# Patient Record
Sex: FEMALE | Race: WHITE
[De-identification: names, ages, dates, MRNs, and addresses within clinical notes are randomized per-mention and may not be internally consistent; named-entity substitution may affect disease eponyms.]

---

## 2017-08-24 ENCOUNTER — HOSPITAL ENCOUNTER (OUTPATIENT)
Dept: HOSPITAL 17 - NEPC | Age: 67
Setting detail: OBSERVATION
LOS: 1 days | Discharge: HOME | End: 2017-08-25
Attending: HOSPITALIST | Admitting: HOSPITALIST
Payer: COMMERCIAL

## 2017-08-24 VITALS
DIASTOLIC BLOOD PRESSURE: 76 MMHG | HEART RATE: 79 BPM | RESPIRATION RATE: 18 BRPM | SYSTOLIC BLOOD PRESSURE: 127 MMHG | TEMPERATURE: 98.5 F | OXYGEN SATURATION: 98 %

## 2017-08-24 VITALS — SYSTOLIC BLOOD PRESSURE: 109 MMHG | DIASTOLIC BLOOD PRESSURE: 76 MMHG | HEART RATE: 66 BPM | RESPIRATION RATE: 20 BRPM

## 2017-08-24 VITALS — HEART RATE: 80 BPM | OXYGEN SATURATION: 99 %

## 2017-08-24 VITALS — HEIGHT: 64 IN | WEIGHT: 180.34 LBS | BODY MASS INDEX: 30.79 KG/M2

## 2017-08-24 VITALS
TEMPERATURE: 98.5 F | DIASTOLIC BLOOD PRESSURE: 65 MMHG | OXYGEN SATURATION: 96 % | HEART RATE: 81 BPM | SYSTOLIC BLOOD PRESSURE: 125 MMHG | RESPIRATION RATE: 20 BRPM

## 2017-08-24 DIAGNOSIS — M54.81: ICD-10-CM

## 2017-08-24 DIAGNOSIS — R00.1: ICD-10-CM

## 2017-08-24 DIAGNOSIS — Z85.3: ICD-10-CM

## 2017-08-24 DIAGNOSIS — E78.5: ICD-10-CM

## 2017-08-24 DIAGNOSIS — Z90.13: ICD-10-CM

## 2017-08-24 DIAGNOSIS — F32.9: ICD-10-CM

## 2017-08-24 DIAGNOSIS — R00.2: ICD-10-CM

## 2017-08-24 DIAGNOSIS — G89.29: ICD-10-CM

## 2017-08-24 DIAGNOSIS — K21.9: ICD-10-CM

## 2017-08-24 DIAGNOSIS — B96.20: ICD-10-CM

## 2017-08-24 DIAGNOSIS — E86.0: ICD-10-CM

## 2017-08-24 DIAGNOSIS — E07.9: ICD-10-CM

## 2017-08-24 DIAGNOSIS — R09.81: ICD-10-CM

## 2017-08-24 DIAGNOSIS — N17.9: ICD-10-CM

## 2017-08-24 DIAGNOSIS — N39.0: ICD-10-CM

## 2017-08-24 DIAGNOSIS — L98.9: ICD-10-CM

## 2017-08-24 DIAGNOSIS — R55: Primary | ICD-10-CM

## 2017-08-24 DIAGNOSIS — Z79.899: ICD-10-CM

## 2017-08-24 DIAGNOSIS — G43.909: ICD-10-CM

## 2017-08-24 DIAGNOSIS — E78.00: ICD-10-CM

## 2017-08-24 DIAGNOSIS — R06.02: ICD-10-CM

## 2017-08-24 DIAGNOSIS — M25.562: ICD-10-CM

## 2017-08-24 DIAGNOSIS — M79.89: ICD-10-CM

## 2017-08-24 DIAGNOSIS — M79.605: ICD-10-CM

## 2017-08-24 DIAGNOSIS — I45.10: ICD-10-CM

## 2017-08-24 DIAGNOSIS — K44.9: ICD-10-CM

## 2017-08-24 LAB
ANION GAP SERPL CALC-SCNC: 6 MEQ/L (ref 5–15)
APTT BLD: 24 SEC (ref 24.3–30.1)
BACTERIA #/AREA URNS HPF: (no result) /HPF
BASOPHILS # BLD AUTO: 0 TH/MM3 (ref 0–0.2)
BASOPHILS NFR BLD: 0.4 % (ref 0–2)
BUN SERPL-MCNC: 15 MG/DL (ref 7–18)
CHLORIDE SERPL-SCNC: 108 MEQ/L (ref 98–107)
CK SERPL-CCNC: 57 U/L (ref 26–192)
COLOR UR: YELLOW
COMMENT (UR): (no result)
CULTURE IF INDICATED: (no result)
EOSINOPHIL # BLD: 0.1 TH/MM3 (ref 0–0.4)
EOSINOPHIL NFR BLD: 1.1 % (ref 0–4)
ERYTHROCYTE [DISTWIDTH] IN BLOOD BY AUTOMATED COUNT: 13.1 % (ref 11.6–17.2)
GFR SERPLBLD BASED ON 1.73 SQ M-ARVRAT: 53 ML/MIN (ref 89–?)
GLUCOSE UR STRIP-MCNC: (no result) MG/DL
HCO3 BLD-SCNC: 26.7 MEQ/L (ref 21–32)
HCT VFR BLD CALC: 36.8 % (ref 35–46)
HEMO FLAGS: (no result)
HGB UR QL STRIP: (no result)
INR PPP: 1 RATIO
KETONES UR STRIP-MCNC: (no result) MG/DL
LYMPHOCYTES # BLD AUTO: 1.7 TH/MM3 (ref 1–4.8)
LYMPHOCYTES NFR BLD AUTO: 19.7 % (ref 9–44)
MCH RBC QN AUTO: 29.8 PG (ref 27–34)
MCHC RBC AUTO-ENTMCNC: 32.2 % (ref 32–36)
MCV RBC AUTO: 92.5 FL (ref 80–100)
MONOCYTES NFR BLD: 4.5 % (ref 0–8)
NEUTROPHILS # BLD AUTO: 6.4 TH/MM3 (ref 1.8–7.7)
NEUTROPHILS NFR BLD AUTO: 74.3 % (ref 16–70)
NITRITE UR QL STRIP: (no result)
PLATELET # BLD: 282 TH/MM3 (ref 150–450)
POTASSIUM SERPL-SCNC: 3.9 MEQ/L (ref 3.5–5.1)
PROTHROMBIN TIME: 10.7 SEC (ref 9.8–11.6)
RBC # BLD AUTO: 3.98 MIL/MM3 (ref 4–5.3)
SODIUM SERPL-SCNC: 141 MEQ/L (ref 136–145)
SP GR UR STRIP: 1.01 (ref 1–1.03)
WBC # BLD AUTO: 8.6 TH/MM3 (ref 4–11)

## 2017-08-24 PROCEDURE — 84484 ASSAY OF TROPONIN QUANT: CPT

## 2017-08-24 PROCEDURE — 96361 HYDRATE IV INFUSION ADD-ON: CPT

## 2017-08-24 PROCEDURE — 82550 ASSAY OF CK (CPK): CPT

## 2017-08-24 PROCEDURE — 84443 ASSAY THYROID STIM HORMONE: CPT

## 2017-08-24 PROCEDURE — 87186 SC STD MICRODIL/AGAR DIL: CPT

## 2017-08-24 PROCEDURE — 93225 XTRNL ECG REC<48 HRS REC: CPT

## 2017-08-24 PROCEDURE — 85730 THROMBOPLASTIN TIME PARTIAL: CPT

## 2017-08-24 PROCEDURE — 85025 COMPLETE CBC W/AUTO DIFF WBC: CPT

## 2017-08-24 PROCEDURE — 99285 EMERGENCY DEPT VISIT HI MDM: CPT

## 2017-08-24 PROCEDURE — 93971 EXTREMITY STUDY: CPT

## 2017-08-24 PROCEDURE — 80048 BASIC METABOLIC PNL TOTAL CA: CPT

## 2017-08-24 PROCEDURE — 85610 PROTHROMBIN TIME: CPT

## 2017-08-24 PROCEDURE — 93226 XTRNL ECG REC<48 HR SCAN A/R: CPT

## 2017-08-24 PROCEDURE — 87086 URINE CULTURE/COLONY COUNT: CPT

## 2017-08-24 PROCEDURE — 93005 ELECTROCARDIOGRAM TRACING: CPT

## 2017-08-24 PROCEDURE — 81001 URINALYSIS AUTO W/SCOPE: CPT

## 2017-08-24 PROCEDURE — 87077 CULTURE AEROBIC IDENTIFY: CPT

## 2017-08-24 PROCEDURE — G0378 HOSPITAL OBSERVATION PER HR: HCPCS

## 2017-08-24 PROCEDURE — 71010: CPT

## 2017-08-24 PROCEDURE — 96365 THER/PROPH/DIAG IV INF INIT: CPT

## 2017-08-24 RX ADMIN — Medication SCH ML: at 20:20

## 2017-08-24 RX ADMIN — PHENYTOIN SODIUM SCH MLS/HR: 50 INJECTION INTRAMUSCULAR; INTRAVENOUS at 20:21

## 2017-08-24 RX ADMIN — CEFUROXIME AXETIL SCH MG: 500 TABLET ORAL at 20:22

## 2017-08-24 RX ADMIN — PROPRANOLOL HYDROCHLORIDE SCH MG: 80 TABLET ORAL at 20:21

## 2017-08-24 NOTE — RADRPT
EXAM DATE/TIME:  08/24/2017 13:26 

 

[facility] COMPARISON:     

FOOT RIGHT COMPLETE ([id number]), November 24, 2015, 3:58.

 

                     

INDICATIONS :     

Shortness of breath.

                     

 

MEDICAL HISTORY :     

Hypercholesterolemia.  Carcinoma, breast.        

 

SURGICAL HISTORY :     

Mastectomy, bilateral.   

 

ENCOUNTER:     

Initial                                        

 

ACUITY:     

2 days      

 

PAIN SCORE:     

0/10

 

LOCATION:     

Bilateral chest 

 

FINDINGS:     

A single view of the chest demonstrates the lungs to be symmetrically aerated without evidence of mas
s, infiltrate or effusion. The examination demonstrates a small hiatal hernia.  The cardiomediastinal
 contours are unremarkable.  Osseous structures are intact.

 

CONCLUSION:     

1. Hiatal hernia. Exam is otherwise unremarkable.

 

 

 

 Héctor Vazquez MD on August 24, 2017 at 13:44           

Board Certified Radiologist.

 This report was verified electronically.

## 2017-08-24 NOTE — HHI.HP
__________________________________________________





[facility]


Service


[facility]


Primary Care Physician


Monique Mckinney MD


Admission Diagnosis





NEAR SYNCOPE; UTI


Diagnoses:  


Chief Complaint:  


Near syncope


Travel History


International Travel<30 Days:  No


Contact w/Intl Traveler <30 Da:  No


Traveled to Known Affected Are:  No


History of Present Illness


67-year-old female with past medical history of palpitations, breast cancer, HLD

, migraines, depression, GERD who presented after a near syncopal episode.  The 

patient states that she's been having issues with left knee pain.  She is going 

to see her PCP today and while at her PCPs office she had a near-syncopal 

episode.  She was sitting at her PCPs office she began having sweating, 

diaphoresis, palpitations.  She laid down and began to feel better, but 

whenever she sat up she felt faint like she was going to pass out.  She denies 

any lightheadedness, dizziness, chest pain, shortness of breath.  She's had 

some sinus congestion and reflux, however denies any recent illness, fever, 

chills, cough, nausea, vomiting, diarrhea.  She denies any recent medication 

changes.  She does have a history of palpitations and has had a Holter monitor 

in the past with Dr. Valenzuela, however palpitations have been well-managed 

since being started on propranolol sometime ago.  She denies any dysuria or 

urinary frequency.





Review of Systems


Except as stated in HPI:  all other systems reviewed are Neg





Past Family Social History


Past Medical History


History of breast cancer


Hyperlipidemia


History of palpitations


Migraines


Depression


GERD


Past Surgical History


EGD/colonoscopy


Multiple breast surgeries; biopsies, lumpectomy, and bilateral mastectomy


Parathyroid tumor removal


Reported Medications





Reported Meds & Active Scripts


Active


Simvastatin 40 Mg Tab 40 Mg PO HS


Ranitidine (Ranitidine HCl) 150 Mg Cap 150 Mg PO DAILY


Venlafaxine ER 24 HR (Venlafaxine HCl) 150 Mg Cap 150 Mg PO DAILY


Reported


Multi Vitamin Daily (Multiple Vitamin) 1 Tab Tab   


Anastrozole 1 Mg Tab 1 Mg PO DAILY


Citracal Plus (Multiple Minerals W/ Vitamins) 1 Tab Tab   


Sumatriptan (Sumatriptan Succinate) 50 Mg Tab 50 Mg PO ONCE PRN


     If a satisfactory response has not been obtained at 2 hours, a


     second


     dose may be administered


Propranolol (Propranolol HCl) 80 Mg Tab 80 Mg PO Q12HR


Allergies:  


Coded Allergies:  


     No Known Allergies (Unverified , 17)


Active Ordered Medications





Current Medications








 Medications


  (Trade)  Dose


 Ordered  Sig/Isha


 Route  Start Time


 Stop Time Status Last Admin


 


  (NS Flush)  2 ml  UNSCH  PRN


 IV FLUSH  17 16:15


     


 


 


  (NS Flush)  2 ml  BID


 IV FLUSH  17 21:00


     


 


 


  (Arimidex)  1 mg  DAILY


 PO  17 09:00


   UNV  


 


 


  (Inderal)  80 mg  Q12HR


 PO  17 21:00


   UNV  


 


 


 Non-Formulary


 Medication  150 mg  DAILY


 PO  17 09:00


   UNV  


 


 


 Non-Formulary


 Medication  40 mg  HS


 PO  17 21:00


   UNV  


 


 


 Sodium Chloride  1,000 ml @ 


 100 mls/hr  Q10H


 IV  17 17:08


   UNV  


 


 


  (NS Flush)  2 ml  UNSCH  PRN


 IV FLUSH  17 17:15


   UNV  


 


 


  (NS Flush)  2 ml  BID


 IV FLUSH  17 21:00


   UNV  


 








Family History


Father  of bladder cancer


Mother  of dementia


Social History


Has 3 or 4 drinks per week


Denies any tobacco or drug use





Physical Exam


Vital Signs





Vital Signs








  Date Time  Temp Pulse Resp B/P (MAP) Pulse Ox O2 Delivery O2 Flow Rate FiO2


 


17 16:34  66 20 109/76 (87)    








Physical Exam


GENERAL: Well-developed well-nourished.  In no acute distress.


SKIN: Warm and dry.  Few diffuse erythematous papules on the extremities.


HEENT: Normocephalic. Pupils equal and round. Mucous membranes pink and moist. 


CARDIOVASCULAR: Regular rate and rhythm.  No murmur appreciated.


RESPIRATORY: No accessory muscle use. Clear to auscultation. Breath sounds 

equal bilaterally. 


GASTROINTESTINAL: Abdomen soft, non-tender, nondistended. Bowel sounds x4.


MUSCULOSKELETAL: No obvious deformities.  Left knee with no significant swelling

, tenderness, erythema, or effusion.  No clubbing or cyanosis. No edema. 


NEUROLOGICAL: Awake and alert. No focal neurological deficits.  Moves upper and 

lower extremities spontaneously. Normal speech.  Strength 5/5.


PSYCHIATRIC: Appropriate mood and affect; insight and judgment normal.


Laboratory





Laboratory Tests








Test


  17


13:50 17


13:55 17


14:05


 


White Blood Count 8.6   


 


Red Blood Count 3.98   


 


Hemoglobin 11.9   


 


Hematocrit 36.8   


 


Mean Corpuscular Volume 92.5   


 


Mean Corpuscular Hemoglobin 29.8   


 


Mean Corpuscular Hemoglobin


Concent 32.2 


  


  


 


 


Red Cell Distribution Width 13.1   


 


Platelet Count 282   


 


Mean Platelet Volume 8.4   


 


Neutrophils (%) (Auto) 74.3   


 


Lymphocytes (%) (Auto) 19.7   


 


Monocytes (%) (Auto) 4.5   


 


Eosinophils (%) (Auto) 1.1   


 


Basophils (%) (Auto) 0.4   


 


Neutrophils # (Auto) 6.4   


 


Lymphocytes # (Auto) 1.7   


 


Monocytes # (Auto) 0.4   


 


Eosinophils # (Auto) 0.1   


 


Basophils # (Auto) 0.0   


 


CBC Comment DIFF FINAL   


 


Differential Comment    


 


Blood Urea Nitrogen 15   


 


Creatinine 1.04   


 


Random Glucose 100   


 


Calcium Level 8.5   


 


Sodium Level 141   


 


Potassium Level 3.9   


 


Chloride Level 108   


 


Carbon Dioxide Level 26.7   


 


Anion Gap 6   


 


Estimat Glomerular Filtration


Rate 53 


  


  


 


 


Total Creatine Kinase 57   


 


Troponin I LESS THAN 0.02   


 


Prothrombin Time  10.7  


 


Prothromb Time International


Ratio 


  1.0 


  


 


 


Activated Partial


Thromboplast Time 


  24.0 


  


 


 


Urine Color   YELLOW 


 


Urine Turbidity   HAZY 


 


Urine pH   7.5 


 


Urine Specific Gravity   1.012 


 


Urine Protein   NEG 


 


Urine Glucose (UA)   NEG 


 


Urine Ketones   NEG 


 


Urine Occult Blood   NEG 


 


Urine Nitrite   POS 


 


Urine Bilirubin   NEG 


 


Urine Urobilinogen   LESS THAN 2.0 


 


Urine Leukocyte Esterase   MOD 


 


Urine RBC   LESS THAN 1 


 


Urine WBC   7 


 


Urine Amorphous Sediment   FEW 


 


Urine Bacteria   MANY 


 


Microscopic Urinalysis Comment


  


  


  CULTURE


INDICATED














 Date/Time


Source Procedure


Growth Status


 


 


 17 14:05


Urine Clean Catch Urine Culture


Pending Received








Result Diagram:  


17 1350                                                                   

             17 1350





Imaging





Last Impressions








Chest X-Ray 17 1313 Signed





Impressions: 





 Service Date/Time:   13:26 - CONCLUSION:  1. Hiatal 





 hernia. Exam is otherwise unremarkable.     Héctor Vazquez MD 


 


Lower Extremity Ultrasound 17 0000 Signed





Impressions: 





 Service Date/Time:   14:33 - CONCLUSION:  1. No DVT 





 identified.     MD Tobias Alonsoi VTE Risk Assessment


Caprini VTE Risk Assessment:  Mod/High Risk (score >= 2)


Caprini Risk Assessment Model











 Point Value = 1          Point Value = 2  Point Value = 3  Point Value = 5


 


Age 41-60


Minor surgery


BMI > 25 kg/m2


Swollen legs


Varicose veins


Pregnancy or postpartum


History of unexplained or recurrent


   spontaneous 


Oral contraceptives or hormone


   replacement


Sepsis (< 1 month)


Serious lung disease, including


   pneumonia (< 1 month)


Abnormal pulmonary function


Acute myocardial infarction


Congestive heart failure (< 1 month)


History of inflammatory bowel disease


Medical patient at bed rest Age 61-74


Arthroscopic surgery


Major open surgery (> 45 min)


Laparoscopic surgery (> 45 min)


Malignancy


Confined to bed (> 72 hours)


Immobilizing plaster cast


Central venous access Age >= 75


History of VTE


Family history of VTE


Factor V Leiden


Prothrombin 58705E


Lupus anticoagulant


Anticardiolipin antibodies


Elevated serum homocysteine


Heparin-induced thrombocytopenia


Other congenital or acquired


   thrombophilia Stroke (< 1 month)


Elective arthroplasty


Hip, pelvis, or leg fracture


Acute spinal cord injury (< 1 month)








Prophylaxis Regimen











   Total Risk


Factor Score Risk Level Prophylaxis Regimen


 


0-1      Low Early ambulation


 


2 Moderate Order ONE of the following:


*Sequential Compression Device (SCD)


*Heparin 5000 units SQ BID


 


3-4 Higher Order ONE of the following medications:


*Heparin 5000 units SQ TID


*Enoxaparin/Lovenox 40 mg SQ daily (WT < 150 kg, CrCl > 30 mL/min)


*Enoxaparin/Lovenox 30 mg SQ daily (WT < 150 kg, CrCl > 10-29 mL/min)


*Enoxaparin/Lovenox 30 mg SQ BID (WT < 150 kg, CrCl > 30 mL/min)


AND/OR


*Sequential Compression Device (SCD)


 


5 or more Highest Order ONE of the following medications:


*Heparin 5000 units SQ TID (Preferred with Epidurals)


*Enoxaparin/Lovenox 40 mg SQ daily (WT < 150 kg, CrCl > 30 mL/min)


*Enoxaparin/Lovenox 30 mg SQ daily (WT < 150 kg, CrCl > 10-29 mL/min)


*Enoxaparin/Lovenox 30 mg SQ BID (WT < 150 kg, CrCl > 30 mL/min)


AND


*Sequential Compression Device (SCD)











Assessment and Plan


Assessment and Plan


67-year-old female with past medical history of palpitations, breast cancer, HLD

, migraines, depression, GERD who presented after a near syncopal episode





Near syncope: Episode of sweating, diaphoresis, palpitations at PCPs office.


Reviewed: EKG with RBBB and no significant ST or T-wave changes, rate 56.  

Initial troponin within normal limits.  Creatinine 1.04, no previous labs for 

comparison.  UA with evidence of UTI.  Afebrile with no leukocytosis.


-Monitor on telemetry.  Consider Holter monitor at discharge and outpatient 

cardiology follow-up if no significant arrhythmias seen.  Cardiology consult if 

significant arrhythmias are seen.


-Check orthostatics


-Trend troponins


-IVF


-Treat UTI as below





Palpitations: Episode as above is not similar to previous palpitations.


-Check TSH


-Continue propranolol





UTI: UA with evidence of UTI.


-Continue empiric IV Rocephin and follow-up urine culture





Dehydration: Creatinine 1.04, no previous labs for comparison.  Possible SHARON.  


-IVF and follow-up BMP.





Left knee pain: Chronic.  Ultrasound negative for DVT.


-Continue outpatient orthopedic follow-up next week





Other chronic medical conditions include breast cancer, HLD, GERD, depression: 

Stable at this time and will continue home medications as indicated.





DVT prophylaxis: SCDs


Discussed Condition With


Patient with  at bedside, ED staff





Attending Statement


Patient denies any active dizziness at this time.  Reports previously with 

slightly diaphoretic.  No complaint of chest pain or shortness of breath.  No 

significant dysuria frequency urgency.


Will admit patient for near syncope and continue monitoring on telemetry.  

Patient has seen Dr. Valenzuela the past and will refer her to follow-up with 

her cardiologist.


Obtain orthostatic blood pressure the morning.  Encourage by mouth hydration.  





The exam, history, and the medical decision making described in the above note 

were completed with the assistance of the dictating practitioner.  I reviewed 

and agree with the findings presented.  I attest that I had a face-to face 

encounter with the patient on the same day and personally performed and 

documented my assessment and findings in the medical record.











Drew Baker Aug 24, 2017 17:18


Irene Bob MD Aug 24, 2017 19:44

## 2017-08-24 NOTE — PD
HPI


Chief Complaint:  dizziness


Time Seen by Provider:  13:07


Travel History


International Travel<30 days:  No


Contact w/Intl Traveler<30days:  No


Traveled to known affect area:  No





History of Present Illness


HPI


67-year-old female with history of headache, occipital neuralgia, or 

thyroidectomy, hypertension, "fast heart rate", presents to emergency 

department for evaluation following a near syncopal episode while at the 

primary care provider's office.  She was being seen there for evaluation of 

left lower extremity pain.  Outpatient ultrasound and x-ray was recommended.  

While PAMELA Segovia was with the patient, patient became acutely diaphoretic and 

lids advised to come to the emergency department.  When the patient got up to 

leave and come by private vehicle, she nearly syncopized.  She did not 

completely lose consciousness.  She was put on the stretcher and [facility] 

was called.  Patient states that she drank juice and coffee this morning and 

took a Lortab because she knew she was going to be up walking on her left lower 

extremity.  She states she has taken Lortab in the past and this is never 

affected her this way.  She denied chest pain or tightness during it.  She 

states she did feel little bit of a palpitation but it resolved prior to the 

episode.  She has no other symptoms to report.  Her cardiologist is Dr. Valenzuela who follows her for her "fast heart rate."





[facility]


Past Medical History


Cardiovascular Problems:  Yes (high cholesterol)


Diminished Hearing:  No


Gout:  Yes


Immunizations Current:  Yes


Thyroid Disease:  Yes


Menopausal:  Yes





Past Surgical History


Other Surgery:  Yes (PARATHYROID SX IN FEB 2015)





Social History


Alcohol Use:  Yes (5x weekly)


Tobacco Use:  No


Substance Use:  No





Allergies-Medications


(Allergen,Severity, Reaction):  


Coded Allergies:  


     No Known Allergies (Unverified , 8/24/17)


Reported Meds & Prescriptions





Reported Meds & Active Scripts


Active


Simvastatin 40 Mg Tab 40 Mg PO HS


Ranitidine (Ranitidine HCl) 150 Mg Cap 150 Mg PO DAILY


Venlafaxine ER 24 HR (Venlafaxine HCl) 150 Mg Cap 150 Mg PO DAILY


Reported


Multi Vitamin Daily (Multiple Vitamin) 1 Tab Tab   


Anastrozole 1 Mg Tab 1 Mg PO DAILY


Citracal Plus (Multiple Minerals W/ Vitamins) 1 Tab Tab   


Sumatriptan (Sumatriptan Succinate) 50 Mg Tab 50 Mg PO ONCE PRN


     If a satisfactory response has not been obtained at 2 hours, a


     second


     dose may be administered


Propranolol (Propranolol HCl) 80 Mg Tab 80 Mg PO Q12HR








Review of Systems


Except as stated in HPI:  all other systems reviewed are Neg





Physical Exam


Narrative


GENERAL: Well-nourished female patient, in no acute distress


SKIN: Focused skin assessment warm/dry.  Scattered scabbed lesions with mild 

erythematous bases.  No drainage.  No significant edema.


HEAD: Atraumatic. Normocephalic. 


EYES: Pupils equal and round. No scleral icterus. No injection or drainage. 


ENT: No nasal bleeding or discharge.  Mucous membranes pink and moist.


NECK: Trachea midline. No JVD. 


CARDIOVASCULAR: Regular rate and rhythm.  


RESPIRATORY: No accessory muscle use. Clear to auscultation. Breath sounds 

equal bilaterally. 


GASTROINTESTINAL: Abdomen soft, non-tender, nondistended. Hepatic and splenic 

margins not palpable. 


MUSCULOSKELETAL: No obvious deformities. No clubbing.  No cyanosis.  No edema. 


NEUROLOGICAL: Awake and alert. No obvious cranial nerve deficits.  Motor 

grossly within normal limits. Normal speech.


PSYCHIATRIC: Appropriate mood and affect; insight and judgment normal.





Data


Data


Orders





 Orders


Electrocardiogram (8/24/17 13:13)


Basic Metabolic Panel (Bmp) (8/24/17 13:13)


Complete Blood Count With Diff (8/24/17 13:13)


Ckmb (Isoenzyme) Profile (8/24/17 13:13)


Troponin I (8/24/17 13:13)


Act Partial Throm Time (Ptt) (8/24/17 13:13)


Prothrombin Time / Inr (Pt) (8/24/17 13:13)


Urinalysis - C+S If Indicated (8/24/17 13:13)


Chest, Single Ap (8/24/17 13:13)


Ecg Monitoring (8/24/17 13:13)


Iv Access Insert/Monitor (8/24/17 13:13)


Oximetry (8/24/17 13:13)


Sodium Chloride 0.9% Flush (Ns Flush) (8/24/17 13:15)


Sodium Chlor 0.9% 1000 Ml Inj (Ns 1000 M (8/24/17 13:13)


Us Leg Venous Doppler (8/24/17 )


Urine Culture (8/24/17 14:05)


Admit Order (Ed Use Only) (8/24/17 16:04)


Ceftriaxone Inj (Rocephin Inj) (8/24/17 16:15)





Labs





Laboratory Tests








Test


  8/24/17


13:50 8/24/17


13:55 8/24/17


14:05


 


White Blood Count 8.6 TH/MM3   


 


Red Blood Count 3.98 MIL/MM3   


 


Hemoglobin 11.9 GM/DL   


 


Hematocrit 36.8 %   


 


Mean Corpuscular Volume 92.5 FL   


 


Mean Corpuscular Hemoglobin 29.8 PG   


 


Mean Corpuscular Hemoglobin


Concent 32.2 % 


  


  


 


 


Red Cell Distribution Width 13.1 %   


 


Platelet Count 282 TH/MM3   


 


Mean Platelet Volume 8.4 FL   


 


Neutrophils (%) (Auto) 74.3 %   


 


Lymphocytes (%) (Auto) 19.7 %   


 


Monocytes (%) (Auto) 4.5 %   


 


Eosinophils (%) (Auto) 1.1 %   


 


Basophils (%) (Auto) 0.4 %   


 


Neutrophils # (Auto) 6.4 TH/MM3   


 


Lymphocytes # (Auto) 1.7 TH/MM3   


 


Monocytes # (Auto) 0.4 TH/MM3   


 


Eosinophils # (Auto) 0.1 TH/MM3   


 


Basophils # (Auto) 0.0 TH/MM3   


 


CBC Comment DIFF FINAL   


 


Differential Comment    


 


Blood Urea Nitrogen 15 MG/DL   


 


Creatinine 1.04 MG/DL   


 


Random Glucose 100 MG/DL   


 


Calcium Level 8.5 MG/DL   


 


Sodium Level 141 MEQ/L   


 


Potassium Level 3.9 MEQ/L   


 


Chloride Level 108 MEQ/L   


 


Carbon Dioxide Level 26.7 MEQ/L   


 


Anion Gap 6 MEQ/L   


 


Estimat Glomerular Filtration


Rate 53 ML/MIN 


  


  


 


 


Total Creatine Kinase 57 U/L   


 


Troponin I


  LESS THAN 0.02


NG/ML 


  


 


 


Prothrombin Time  10.7 SEC  


 


Prothromb Time International


Ratio 


  1.0 RATIO 


  


 


 


Activated Partial


Thromboplast Time 


  24.0 SEC 


  


 


 


Urine Color   YELLOW 


 


Urine Turbidity   HAZY 


 


Urine pH   7.5 


 


Urine Specific Gravity   1.012 


 


Urine Protein   NEG mg/dL 


 


Urine Glucose (UA)   NEG mg/dL 


 


Urine Ketones   NEG mg/dL 


 


Urine Occult Blood   NEG 


 


Urine Nitrite   POS 


 


Urine Bilirubin   NEG 


 


Urine Urobilinogen


  


  


  LESS THAN 2.0


MG/DL


 


Urine Leukocyte Esterase   MOD 


 


Urine RBC


  


  


  LESS THAN 1


/hpf


 


Urine WBC   7 /hpf 


 


Urine Amorphous Sediment   FEW 


 


Urine Bacteria   MANY /hpf 


 


Microscopic Urinalysis Comment


  


  


  CULTURE


INDICATED











MDM


Medical Decision Making


Medical Screen Exam Complete:  Yes


Emergency Medical Condition:  Yes


Medical Record Reviewed:  Yes


Differential Diagnosis


Syncope versus near-syncope versus medication side effect versus electrolyte 

abnormality versus arrhythmia


Narrative Course


67-year-old female presents to emergency department for evaluation.  Patient 

appears without distress.  She is still mildly diaphoretic.  Her vital signs 

are stable.  She is not having chest pain or tightness.








Laboratory Tests








Test


  8/24/17


13:50 8/24/17


13:55 8/24/17


14:05


 


White Blood Count 8.6 TH/MM3   


 


Red Blood Count 3.98 MIL/MM3   


 


Hemoglobin 11.9 GM/DL   


 


Hematocrit 36.8 %   


 


Mean Corpuscular Volume 92.5 FL   


 


Mean Corpuscular Hemoglobin 29.8 PG   


 


Mean Corpuscular Hemoglobin


Concent 32.2 % 


  


  


 


 


Red Cell Distribution Width 13.1 %   


 


Platelet Count 282 TH/MM3   


 


Mean Platelet Volume 8.4 FL   


 


Neutrophils (%) (Auto) 74.3 %   


 


Lymphocytes (%) (Auto) 19.7 %   


 


Monocytes (%) (Auto) 4.5 %   


 


Eosinophils (%) (Auto) 1.1 %   


 


Basophils (%) (Auto) 0.4 %   


 


Neutrophils # (Auto) 6.4 TH/MM3   


 


Lymphocytes # (Auto) 1.7 TH/MM3   


 


Monocytes # (Auto) 0.4 TH/MM3   


 


Eosinophils # (Auto) 0.1 TH/MM3   


 


Basophils # (Auto) 0.0 TH/MM3   


 


CBC Comment DIFF FINAL   


 


Differential Comment    


 


Blood Urea Nitrogen 15 MG/DL   


 


Creatinine 1.04 MG/DL   


 


Random Glucose 100 MG/DL   


 


Calcium Level 8.5 MG/DL   


 


Sodium Level 141 MEQ/L   


 


Potassium Level 3.9 MEQ/L   


 


Chloride Level 108 MEQ/L   


 


Carbon Dioxide Level 26.7 MEQ/L   


 


Anion Gap 6 MEQ/L   


 


Estimat Glomerular Filtration


Rate 53 ML/MIN 


  


  


 


 


Total Creatine Kinase 57 U/L   


 


Troponin I


  LESS THAN 0.02


NG/ML 


  


 


 


Prothrombin Time  10.7 SEC  


 


Prothromb Time International


Ratio 


  1.0 RATIO 


  


 


 


Activated Partial


Thromboplast Time 


  24.0 SEC 


  


 


 


Urine Color   YELLOW 


 


Urine Turbidity   HAZY 


 


Urine pH   7.5 


 


Urine Specific Gravity   1.012 


 


Urine Protein   NEG mg/dL 


 


Urine Glucose (UA)   NEG mg/dL 


 


Urine Ketones   NEG mg/dL 


 


Urine Occult Blood   NEG 


 


Urine Nitrite   POS 


 


Urine Bilirubin   NEG 


 


Urine Urobilinogen


  


  


  LESS THAN 2.0


MG/DL


 


Urine Leukocyte Esterase   MOD 


 


Urine RBC


  


  


  LESS THAN 1


/hpf


 


Urine WBC   7 /hpf 


 


Urine Amorphous Sediment   FEW 


 


Urine Bacteria   MANY /hpf 


 


Microscopic Urinalysis Comment


  


  


  CULTURE


INDICATED








Last Impressions








Chest X-Ray 8/24/17 1313 Signed





Impressions: 





 Service Date/Time:  Thursday, August 24, 2017 13:26 - CONCLUSION:  1. Hiatal 





 hernia. Exam is otherwise unremarkable.     Héctor Vazquez MD 


 


Lower Extremity Ultrasound 8/24/17 0000 Signed





Impressions: 





 Service Date/Time:  Thursday, August 24, 2017 14:33 - CONCLUSION:  1. No DVT 





 identified.     Héctor Vazquez MD 





Review the lab findings and radiology images.  Patient will be started on oral 

antibiotics.  I discussed the patient with my attending.  I also discussed 

patient with lives to assess her today.  Did not see with the patient has had 

cardiac workup here.  She does not recall ultrasound of her carotids, 

echocardiogram, or any significant cardiac workup.  It is in the patient's best 

interest to be admitted observation for further evaluation of these symptoms.





Diagnosis





 Primary Impression:  


 Near syncope


 Additional Impressions:  


 UTI (urinary tract infection)


 Qualified Codes:  N39.0 - Urinary tract infection, site not specified; R31.9 - 

Hematuria, unspecified


 Skin lesions





Admitting Information


Admitting Physician Requests:  Observation


Condition:  Stable











Jennifer Mckeon Aug 24, 2017 13:10

## 2017-08-24 NOTE — RADRPT
EXAM DATE/TIME:  08/24/2017 14:33 

 

[facility] COMPARISON:     

No previous studies available for comparison.

        

 

 

INDICATIONS :                

Left leg swelling.

            

 

MEDICAL HISTORY :     

Hypercholesterolemia.      Thyroid disease. Renal calculi. Gout. Alcohol use. Occipital neuralgia.

 

SURGICAL HISTORY :         

Mastectomy. Left breast lumpectomy. Lithotripsy. Breast biopsy x 2. Sinus septoplasty. Parathyroid tu
mor removal.

 

ENCOUNTER:     

Initial

 

ACUITY:     

1 day

 

PAIN SCORE:      

3/10

 

LOCATION:      

Left  leg.

            

                      

 

TECHNIQUE:     

Venous ultrasound of the leg was performed from the inguinal ligament to the proximal calf.  Real-maureen
e, color Doppler and spectral tracing, compression and augmentation techniques were used.  

 

FINDINGS:     

There is normal compressibility of the deep venous system from the inguinal region to the proximal ca
lf.  No echogenic clot is seen in the lumen of the common femoral, femoral, popliteal, and posterior 
tibial veins.  There is a normal response of the venous system to proximal and distal augmentation an
d respiration.  

 

CONCLUSION:     

1. No DVT identified.

 

 

 

 Héctor Vazquez MD on August 24, 2017 at 15:12           

Board Certified Radiologist.

 This report was verified electronically.

## 2017-08-25 VITALS
SYSTOLIC BLOOD PRESSURE: 108 MMHG | HEART RATE: 73 BPM | TEMPERATURE: 98.1 F | DIASTOLIC BLOOD PRESSURE: 62 MMHG | RESPIRATION RATE: 20 BRPM | OXYGEN SATURATION: 96 %

## 2017-08-25 VITALS
TEMPERATURE: 97.6 F | DIASTOLIC BLOOD PRESSURE: 68 MMHG | OXYGEN SATURATION: 97 % | HEART RATE: 80 BPM | RESPIRATION RATE: 16 BRPM | SYSTOLIC BLOOD PRESSURE: 122 MMHG

## 2017-08-25 VITALS
RESPIRATION RATE: 16 BRPM | HEART RATE: 70 BPM | SYSTOLIC BLOOD PRESSURE: 122 MMHG | TEMPERATURE: 97.6 F | OXYGEN SATURATION: 97 % | DIASTOLIC BLOOD PRESSURE: 78 MMHG

## 2017-08-25 VITALS
DIASTOLIC BLOOD PRESSURE: 55 MMHG | SYSTOLIC BLOOD PRESSURE: 105 MMHG | OXYGEN SATURATION: 94 % | RESPIRATION RATE: 18 BRPM | TEMPERATURE: 97.9 F | HEART RATE: 69 BPM

## 2017-08-25 VITALS — HEART RATE: 69 BPM

## 2017-08-25 VITALS — HEART RATE: 67 BPM

## 2017-08-25 VITALS — HEART RATE: 73 BPM

## 2017-08-25 LAB
ANION GAP SERPL CALC-SCNC: 8 MEQ/L (ref 5–15)
BASOPHILS # BLD AUTO: 0 TH/MM3 (ref 0–0.2)
BASOPHILS NFR BLD: 0.5 % (ref 0–2)
BUN SERPL-MCNC: 18 MG/DL (ref 7–18)
CHLORIDE SERPL-SCNC: 111 MEQ/L (ref 98–107)
EOSINOPHIL # BLD: 0.1 TH/MM3 (ref 0–0.4)
EOSINOPHIL NFR BLD: 1.9 % (ref 0–4)
ERYTHROCYTE [DISTWIDTH] IN BLOOD BY AUTOMATED COUNT: 13.2 % (ref 11.6–17.2)
GFR SERPLBLD BASED ON 1.73 SQ M-ARVRAT: 59 ML/MIN (ref 89–?)
HCO3 BLD-SCNC: 26 MEQ/L (ref 21–32)
HCT VFR BLD CALC: 32.5 % (ref 35–46)
HEMO FLAGS: (no result)
LYMPHOCYTES # BLD AUTO: 2.3 TH/MM3 (ref 1–4.8)
LYMPHOCYTES NFR BLD AUTO: 30.1 % (ref 9–44)
MCH RBC QN AUTO: 29.4 PG (ref 27–34)
MCHC RBC AUTO-ENTMCNC: 32 % (ref 32–36)
MCV RBC AUTO: 91.8 FL (ref 80–100)
MONOCYTES NFR BLD: 11.8 % (ref 0–8)
NEUTROPHILS # BLD AUTO: 4.3 TH/MM3 (ref 1.8–7.7)
NEUTROPHILS NFR BLD AUTO: 55.7 % (ref 16–70)
PLATELET # BLD: 240 TH/MM3 (ref 150–450)
POTASSIUM SERPL-SCNC: 3.7 MEQ/L (ref 3.5–5.1)
RBC # BLD AUTO: 3.54 MIL/MM3 (ref 4–5.3)
SODIUM SERPL-SCNC: 145 MEQ/L (ref 136–145)
WBC # BLD AUTO: 7.7 TH/MM3 (ref 4–11)

## 2017-08-25 RX ADMIN — Medication SCH ML: at 08:00

## 2017-08-25 RX ADMIN — PROPRANOLOL HYDROCHLORIDE SCH MG: 80 TABLET ORAL at 08:08

## 2017-08-25 RX ADMIN — CEFUROXIME AXETIL SCH MG: 500 TABLET ORAL at 08:01

## 2017-08-25 RX ADMIN — PHENYTOIN SODIUM SCH MLS/HR: 50 INJECTION INTRAMUSCULAR; INTRAVENOUS at 05:59

## 2017-08-25 RX ADMIN — PHENYTOIN SODIUM SCH MLS/HR: 50 INJECTION INTRAMUSCULAR; INTRAVENOUS at 12:55

## 2017-08-25 NOTE — EKG
Date Performed: 08/24/2017       Time Performed: 13:11:37

 

PTAGE:      67 years

 

EKG:      SINUS BRADYCARDIA BORDERLINE ECG INTERPRETATION BASED ON A DEFAULT AGE OF 40 YEARS 

 

NO PREVIOUS TRACING            

 

DOCTOR:   Willy Simental  Interpretating Date/Time  08/25/2017 11:12:59

## 2017-08-25 NOTE — HHI.PR
Subjective


Remarks


Follow-up for her syncopal episode.  The patient then did well overnight.  She 

denies any further episodes.  She denies any chest pain, shortness of breath, 

palpitations, lightheadedness, dizziness.  She has been able to ambulate, but 

with her knee pain, she is requesting a walker.  Telemetry unremarkable.  The 

patient is happy to go home today and follow-up with her PCP next week.





Objective


Vitals





Vital Signs








  Date Time  Temp Pulse Resp B/P (MAP) Pulse Ox O2 Delivery O2 Flow Rate FiO2


 


8/25/17 12:09 97.6 80 16 122/68 (86) 97   


 


8/25/17 08:11 97.6 70 16 122/60 (80) 97   





    124/78 (93)    





    125/82 (96)    


 


8/25/17 08:00  73      


 


8/25/17 04:05  67      


 


8/25/17 04:02 97.9 69 18 105/55 (72) 94   


 


8/25/17 00:42 98.1 73 20 108/62 (77) 96   


 


8/25/17 00:35  69      


 


8/24/17 20:30     99   


 


8/24/17 20:30  80      


 


8/24/17 20:06 98.5 81 20 125/65 (85) 96   





    115/63 (80)    


 


8/24/17 18:07        21


 


8/24/17 17:43 98.5 79 18 127/76 (93) 98   





    112/62 (79)    


 


8/24/17 16:34  66 20 109/76 (87)    














I/O      


 


 8/24/17 8/24/17 8/24/17 8/25/17 8/25/17 8/25/17





 07:00 15:00 23:00 07:00 15:00 23:00


 


Intake Total    981 ml  


 


Balance    981 ml  


 


      


 


Intake IV Total    981 ml  








Result Diagram:  


8/25/17 0202                                                                   

             8/25/17 0202





Imaging





Last Impressions








Chest X-Ray 8/24/17 1313 Signed





Impressions: 





 Service Date/Time:  Thursday, August 24, 2017 13:26 - CONCLUSION:  1. Hiatal 





 hernia. Exam is otherwise unremarkable.     Héctor Vazquez MD 


 


Lower Extremity Ultrasound 8/24/17 0000 Signed





Impressions: 





 Service Date/Time:  Thursday, August 24, 2017 14:33 - CONCLUSION:  1. No DVT 





 identified.     Héctor Vazquez MD 








Objective Remarks


GENERAL: Well-developed well-nourished.  In no acute distress.


SKIN: Warm and dry.  Few diffuse erythematous papules on the extremities.


HEENT: Normocephalic. Pupils equal and round. Mucous membranes pink and moist. 


CARDIOVASCULAR: Regular rate and rhythm.  No murmur appreciated.


RESPIRATORY: No accessory muscle use. Clear to auscultation. Breath sounds 

equal bilaterally. 


GASTROINTESTINAL: Abdomen soft, non-tender, nondistended. Bowel sounds x4.


MUSCULOSKELETAL: No obvious deformities. No clubbing or cyanosis. No edema. 


NEUROLOGICAL: Awake and alert. No focal neurological deficits.  Moves upper and 

lower extremities spontaneously. Normal speech.


PSYCHIATRIC: Appropriate mood and affect; insight and judgment normal.





A/P


Assessment and Plan


67-year-old female with past medical history of palpitations, breast cancer, HLD

, migraines, depression, GERD who presented after a near syncopal episode





Near syncope: Episode of sweating, diaphoresis, palpitations at PCPs office.  

Suspect symptoms secondary to dehydration and transient orthostasis.


Reviewed: EKG with RBBB and no significant ST or T-wave changes, rate 56.  

Troponin negative 3..  Creatinine 1.04, no previous labs for comparison.  UA 

with evidence of UTI.  Afebrile with no leukocytosis.  Orthostatic blood 

pressures yesterday were positive, but now patient is no longer orthostatic 

after IVF.


-Monitor on telemetry.  Holter at discharge and outpatient cardiology follow-

up.  


-Improved





Palpitations: Episode as above is not similar to previous palpitations.  TSH 

within normal limits.  Reviewed telemetry overnight which showed no significant 

abnormalities.


-Continue propranolol


-Place Holter and follow with cardiology as outpatient for results





UTI: UA with evidence of UTI.


-Given IV Rocephin 1 and started on oral cefuroxime


-Urine culture pending


-Continue on cefuroxime 3 days and follow up with PCP





Dehydration/SHARON: Creatinine 1.04, no previous labs for comparison.  Creatinine 

improved to 0.94 overnight with IVF.


-Improved





Left knee pain: Chronic.  Ultrasound negative for DVT.  Ambulatory.


-Continue outpatient orthopedic follow-up next week


-Walker





Other chronic medical conditions include breast cancer, HLD, GERD, depression: 

Stable at this time and will continue home medications as indicated.





DVT prophylaxis: SCDs


Discharge Planning


Discharge patient to home


Condition on discharge: Improved


Heart healthy Diet as tolerated


Regular activity


Rx written: Macrobid, walker


Follow-up with primary care physician and cardiology











Drew Baker Aug 25, 2017 13:05

## 2017-08-28 NOTE — HM
Date Performed: 08/25/2017       Time Performed: 14:22:00

 

HOOKUP DATE:      08/25/17 02:22:00 PM Fri 

 

     

ANALYSIS START TIME:      8/25/2017 2:27:00 PM

     

ANALYSIS END TIME:      8/26/2017 2:07:35 PM

     

PATIENT AGE:      67

     

PATIENT HEIGHT:      64

     

PATIENT WEIGHT:      180

     

DRUG LIST:      G76/DISCHARGED

     

PATIENT DIAGNOSIS:      NEAR SYNCOPE

     

TEST NARRATIVE:          The patient's average heart rate was 76 BPM.  No episodes of tachycardia wer
e noted.  No episodes of bradycardia were noted.     No pauses exceeding 2.0 seconds were noted.     
3 ventricular ectopics, which represented < 1% of the total beat count, were noted.  The highest vent
ricular ectopic frequency occurred from 07:00 AM to 08:00 AM Sat.  During this time 2 VE(s) occurred.
  Ventricular ectopics were observed as 3 isolated beat(s) only.  No couplets or runs were noted.    
 9 supraventricular ectopics, which represented < 1% of the total beat count, were noted.  The highes
t supraventricular ectopic frequency occurred from 04:00 AM to 05:00 AM Sat.  During this time 3 SVE(
s) occurred.     No episodes of ST depression (defined as -1.0 mm or more) were noted in channel 1.  
No episodes of ST depression (defined as -1.0 mm or more) were noted in channel 2.  No episodes of ST
 depression (defined as -1.0 mm or more) were noted in channel 3.

     

TEST INTERPRETATION:      Patient was monitored for 23 hours and 41 minutes. Minimum heart rate was 5
9, maximum heart rate 106, average heart rate was 76. There were 2 PVCs and 6 PACs with one 3-beat at
rial run. Conclusion Essentially unremarkable Holter monitor showing only occasional ectopy and a franny
rt 3-beat atrial run without any atrial fibrillation or other sustained disrhythmia.                 
                                                    Signed by : Gildardo Flor

## 2017-10-02 ENCOUNTER — HOSPITAL ENCOUNTER (OUTPATIENT)
Dept: HOSPITAL 17 - ESDC | Age: 67
Discharge: HOME | End: 2017-10-02
Attending: INTERNAL MEDICINE
Payer: COMMERCIAL

## 2017-10-02 DIAGNOSIS — K44.9: ICD-10-CM

## 2017-10-02 DIAGNOSIS — D50.9: Primary | ICD-10-CM

## 2017-10-02 DIAGNOSIS — K92.1: ICD-10-CM

## 2017-10-02 DIAGNOSIS — K29.70: ICD-10-CM

## 2017-10-02 DIAGNOSIS — K20.9: ICD-10-CM

## 2017-10-02 PROCEDURE — 88312 SPECIAL STAINS GROUP 1: CPT

## 2017-10-02 PROCEDURE — 88305 TISSUE EXAM BY PATHOLOGIST: CPT

## 2017-10-02 NOTE — GIPROC
[facility]

[address], [address]

 

 

EGD PROCEDURE REPORT     EXAM DATE: 10/02/2017

 

PATIENT NAME:      Tiffanie Lui           MR #:      [id number]

YOB: 1950      VISIT #:     [id number]

ATTENDING:     Yael Joyce MD     ORDER #:     [id number]

ASSISTANT:      Marshal Freeman RN     STATUS:     outpatient

 

INDICATIONS:  The patient is a 67 yr old female here for an EGD due to iron

deficiency anemia and occult blood positive

PROCEDURE PERFORMED:     EGD w/ biopsy

MEDICATIONS:     None and Per Anesthesia.

TOPICAL ANESTHETIC:

 

CONSENT: The patient understands the risks and benefits of the procedure and

understands that these risks include, but are not limited to: sedation,

allergic reaction, infection, perforation and/or bleeding. Alternative means of

evaluation and treatment include, among others: physical exam, x-rays, and/or

surgical intervention. The patient elects to proceed with this endoscopic

procedure.

 



medical equipment was checked for proper function. Hand hygiene and appropriate

measures for infection prevention was taken. After the risks, benefits and

alternatives of the procedure were thoroughly explained, Informed consent was

verified, confirmed and timeout was successfully executed by the treatment

team. The patient was anesthetized with topical anesthesia and the EG-[id number]

([id number]) endoscope was introduced through the mouth and advanced to the second

portion of the duodenum.  Retroflexed views revealed a hiatal hernia  The

gastroscope was then slowly withdrawn and removed.

 

ESOPHAGUS: There was LA Class A esophagitis noted.  A biopsy was performed using

cold forceps.  Sample sent for histology.

 

STOMACH: There was mild gastritis in the gastric antrum.  A biopsy was performed

using cold forceps.  Sample sent for histology.

 

DUODENUM: The duodenal mucosa appeared normal in the bulb and second portion of

the duodenum.

 

 

 

ADVERSE EVENTS:     There were no complications.

IMPRESSIONS:     1.  There was LA Class A esophagitis noted; biopsy was

performed

2.  There was mild gastritis in the gastric antrum; biopsy was performed

3.  Normal duodenal mucosa in the bulb and second portion of the duodenum

4.  Retroflexed views revealed a hiatal hernia

 

RECOMMENDATIONS:     1.  Await biopsy results.  Biopsy results will not be ready

for 7-10 days.  If you don't hear from us in two weeks, call our office for

biopsy results.

2.  Anti-reflux regimen

3.  Continue PPI

4.  Avoid NSAIDS

5.  Follow-up: GI clinic 2 week(s)

PATIENT CONDITION:     stable

DISPOSITION:     Home

REPEAT EXAM:     Return 1 year EGD pending biopsy results

 

 

___________________________________

Yael Joyce MD

eSigned:  Yael Joyce MD 10/02/2017 2:46 PM

 

 

cc: Estevan West

 

 

 

 

PATIENT NAME:  Tiffanie Lui

MR#: [id number]

## 2018-04-11 ENCOUNTER — HOSPITAL ENCOUNTER (OUTPATIENT)
Dept: HOSPITAL 17 - NEPC | Age: 68
Setting detail: OBSERVATION
LOS: 1 days | Discharge: HOME | End: 2018-04-12
Attending: INTERNAL MEDICINE | Admitting: INTERNAL MEDICINE
Payer: COMMERCIAL

## 2018-04-11 VITALS
DIASTOLIC BLOOD PRESSURE: 82 MMHG | RESPIRATION RATE: 16 BRPM | SYSTOLIC BLOOD PRESSURE: 151 MMHG | OXYGEN SATURATION: 95 % | HEART RATE: 76 BPM

## 2018-04-11 VITALS — BODY MASS INDEX: 33.98 KG/M2 | WEIGHT: 191.8 LBS | HEIGHT: 63 IN

## 2018-04-11 VITALS
RESPIRATION RATE: 19 BRPM | HEART RATE: 72 BPM | SYSTOLIC BLOOD PRESSURE: 137 MMHG | DIASTOLIC BLOOD PRESSURE: 74 MMHG | OXYGEN SATURATION: 99 %

## 2018-04-11 VITALS
RESPIRATION RATE: 17 BRPM | SYSTOLIC BLOOD PRESSURE: 176 MMHG | DIASTOLIC BLOOD PRESSURE: 108 MMHG | TEMPERATURE: 97.6 F | HEART RATE: 82 BPM | OXYGEN SATURATION: 98 %

## 2018-04-11 VITALS
OXYGEN SATURATION: 96 % | RESPIRATION RATE: 22 BRPM | SYSTOLIC BLOOD PRESSURE: 131 MMHG | DIASTOLIC BLOOD PRESSURE: 83 MMHG | HEART RATE: 75 BPM

## 2018-04-11 VITALS
RESPIRATION RATE: 16 BRPM | DIASTOLIC BLOOD PRESSURE: 80 MMHG | SYSTOLIC BLOOD PRESSURE: 136 MMHG | HEART RATE: 82 BPM | OXYGEN SATURATION: 96 %

## 2018-04-11 VITALS
HEART RATE: 74 BPM | DIASTOLIC BLOOD PRESSURE: 97 MMHG | RESPIRATION RATE: 22 BRPM | SYSTOLIC BLOOD PRESSURE: 179 MMHG | OXYGEN SATURATION: 99 %

## 2018-04-11 VITALS
DIASTOLIC BLOOD PRESSURE: 84 MMHG | TEMPERATURE: 98.2 F | OXYGEN SATURATION: 96 % | RESPIRATION RATE: 17 BRPM | SYSTOLIC BLOOD PRESSURE: 137 MMHG | HEART RATE: 90 BPM

## 2018-04-11 DIAGNOSIS — F32.9: ICD-10-CM

## 2018-04-11 DIAGNOSIS — R07.89: Primary | ICD-10-CM

## 2018-04-11 DIAGNOSIS — Z85.3: ICD-10-CM

## 2018-04-11 DIAGNOSIS — K44.9: ICD-10-CM

## 2018-04-11 DIAGNOSIS — R51: ICD-10-CM

## 2018-04-11 DIAGNOSIS — F41.9: ICD-10-CM

## 2018-04-11 DIAGNOSIS — I10: ICD-10-CM

## 2018-04-11 DIAGNOSIS — K21.9: ICD-10-CM

## 2018-04-11 DIAGNOSIS — E78.00: ICD-10-CM

## 2018-04-11 DIAGNOSIS — K57.90: ICD-10-CM

## 2018-04-11 DIAGNOSIS — Z90.13: ICD-10-CM

## 2018-04-11 LAB
BASOPHILS # BLD AUTO: 0 TH/MM3 (ref 0–0.2)
BASOPHILS NFR BLD: 0.5 % (ref 0–2)
BUN SERPL-MCNC: 16 MG/DL (ref 7–18)
CALCIUM SERPL-MCNC: 9.2 MG/DL (ref 8.5–10.1)
CHLORIDE SERPL-SCNC: 107 MEQ/L (ref 98–107)
CREAT SERPL-MCNC: 1.15 MG/DL (ref 0.5–1)
EOSINOPHIL # BLD: 0.1 TH/MM3 (ref 0–0.4)
EOSINOPHIL NFR BLD: 1.6 % (ref 0–4)
ERYTHROCYTE [DISTWIDTH] IN BLOOD BY AUTOMATED COUNT: 14.6 % (ref 11.6–17.2)
GFR SERPLBLD BASED ON 1.73 SQ M-ARVRAT: 47 ML/MIN (ref 89–?)
GLUCOSE SERPL-MCNC: 101 MG/DL (ref 74–106)
HCO3 BLD-SCNC: 27.9 MEQ/L (ref 21–32)
HCT VFR BLD CALC: 42.2 % (ref 35–46)
HGB BLD-MCNC: 14.2 GM/DL (ref 11.6–15.3)
LYMPHOCYTES # BLD AUTO: 1.9 TH/MM3 (ref 1–4.8)
LYMPHOCYTES NFR BLD AUTO: 24 % (ref 9–44)
MCH RBC QN AUTO: 28.8 PG (ref 27–34)
MCHC RBC AUTO-ENTMCNC: 33.7 % (ref 32–36)
MCV RBC AUTO: 85.3 FL (ref 80–100)
MONOCYTE #: 0.7 TH/MM3 (ref 0–0.9)
MONOCYTES NFR BLD: 9 % (ref 0–8)
NEUTROPHILS # BLD AUTO: 5.2 TH/MM3 (ref 1.8–7.7)
NEUTROPHILS NFR BLD AUTO: 64.9 % (ref 16–70)
PLATELET # BLD: 254 TH/MM3 (ref 150–450)
PMV BLD AUTO: 9.1 FL (ref 7–11)
RBC # BLD AUTO: 4.95 MIL/MM3 (ref 4–5.3)
SODIUM SERPL-SCNC: 141 MEQ/L (ref 136–145)
TROPONIN I SERPL-MCNC: (no result) NG/ML (ref 0.02–0.05)
WBC # BLD AUTO: 8 TH/MM3 (ref 4–11)

## 2018-04-11 PROCEDURE — 80048 BASIC METABOLIC PNL TOTAL CA: CPT

## 2018-04-11 PROCEDURE — 99285 EMERGENCY DEPT VISIT HI MDM: CPT

## 2018-04-11 PROCEDURE — 71275 CT ANGIOGRAPHY CHEST: CPT

## 2018-04-11 PROCEDURE — 78452 HT MUSCLE IMAGE SPECT MULT: CPT

## 2018-04-11 PROCEDURE — 85025 COMPLETE CBC W/AUTO DIFF WBC: CPT

## 2018-04-11 PROCEDURE — 84484 ASSAY OF TROPONIN QUANT: CPT

## 2018-04-11 PROCEDURE — A9502 TC99M TETROFOSMIN: HCPCS

## 2018-04-11 PROCEDURE — 82550 ASSAY OF CK (CPK): CPT

## 2018-04-11 PROCEDURE — 93005 ELECTROCARDIOGRAM TRACING: CPT

## 2018-04-11 PROCEDURE — 71046 X-RAY EXAM CHEST 2 VIEWS: CPT

## 2018-04-11 PROCEDURE — 93017 CV STRESS TEST TRACING ONLY: CPT

## 2018-04-11 PROCEDURE — G0378 HOSPITAL OBSERVATION PER HR: HCPCS

## 2018-04-11 PROCEDURE — 74174 CTA ABD&PLVS W/CONTRAST: CPT

## 2018-04-11 RX ADMIN — Medication SCH ML: at 21:00

## 2018-04-11 NOTE — PD
HPI


Chief Complaint:  Chest Pain


Time Seen by Provider:  15:32


Travel History


International Travel<30 days:  No


Contact w/Intl Traveler<30days:  No


Traveled to known affect area:  No





History of Present Illness


HPI


67-year-old female complained of chest pain.  Patient states that she is having 

chest pain intermittent for the past 3 days.  Patient states that the pain is 

pressure pain started on upper chest which radiated to throat.  Patient denies 

any nausea or diaphoresis.  Patient denies any palpitation.  Patient states 

that the pain does not radiate to the back.  Patient states that the pain 

worsening waning however never resolved completely for the past 3 days.  

Patient denies any coughing congestion fever chills.  Patient states that she 

has mild intermittent headache in the back of the left-sided head, associated 

with chest pain for the past 3 days.  Patient denies any visual change.  

Patient denies any neck pain.  Patient denies any head injury.  Patient denies 

any photophobia or nausea vomiting.  Patient denies any history of headache in 

the past.  Patient denies history of CAD.  Patient has history hypertension and 

hyperlipidemia.  Patient denies history of diabetes.  Patient is a non-smoker.  

Patient has family history of heart disease and aortic aneurysm.





PFSH


Past Medical History


Blood Disorders:  No


Anxiety:  Yes


Depression:  Yes


Heart Rhythm Problems:  Yes (tachycardia)


Cancer:  Yes (breast cancer - 2016)


Cardiovascular Problems:  Yes (high cholesterol)


High Cholesterol:  Yes


Chemotherapy:  No


Diminished Hearing:  No


Endocrine:  Yes (parathyriod removed)


Gout:  Yes


Genitourinary:  Yes (UTI)


Immune Disorder:  No


Musculoskeletal:  No


Neurologic:  Yes (depression and anxiety)


Psychiatric:  Yes


Reproductive:  Yes (D&C)


Respiratory:  No


Immunizations Current:  Yes


Radiation Therapy:  No


Thyroid Disease:  Yes


Menopausal:  Yes





Past Surgical History


Other Surgery:  Yes (PARATHYROID SX IN FEB 2015)





Social History


Alcohol Use:  Yes (5x weekly)


Tobacco Use:  No


Substance Use:  No





Allergies-Medications


(Allergen,Severity, Reaction):  


Coded Allergies:  


     No Known Allergies (Verified  Adverse Reaction, Unknown, 4/11/18)


Reported Meds & Prescriptions





Reported Meds & Active Scripts


Active


Omeprazole 40 Mg Cap 40 Mg PO DAILY


Reported


Betamethasone Dipropionate Topical 0.05% Cream 1 Applic TOPICAL BID


Ditropan (Oxybutynin Chloride) 5 Mg Tab 5 Mg PO HS


Metoprolol Tartrate 25 Mg Tab 25 Mg PO BID


Atorvastatin (Atorvastatin Calcium) 20 Mg Tab 20 Mg PO HS


Citracal Maximum (Calcium Citrate-Vitamin D) 315-250 Mg-Unit Tab 2 Tab PO BID


Tylenol (Acetaminophen) 325 Mg Tab 325 Mg PO Q6H PRN


Indomethacin 50 Mg Cap 50 Mg PO TID PRN


     Take with food, milk, or antacids to decrease


     stomach adverse effects.


Multi Vitamin Daily (Multiple Vitamin) 1 Tab Tab 1 Tab PO HS


Sumatriptan (Sumatriptan Succinate) 50 Mg Tab 50 Mg PO ONCE PRN


     If a satisfactory response has not been obtained at 2 hours, a


     second


     dose may be administered








Review of Systems


General / Constitutional:  No: Fever


Eyes:  No: Visual changes


HENT:  Positive: Headaches


Cardiovascular:  Positive: Chest Pain or Discomfort


Respiratory:  No: Shortness of Breath


Gastrointestinal:  No: Abdominal Pain


Genitourinary:  No: Dysuria


Musculoskeletal:  No: Pain


Skin:  No Rash


Neurologic:  No: Weakness


Psychiatric:  No: Depression


Endocrine:  No: Polydipsia


Hematologic/Lymphatic:  No: Easy Bruising





Physical Exam


Narrative


GENERAL: Well-nourished, well-developed patient.


SKIN: Focused skin assessment warm/dry.


HEAD: Normocephalic.


EYES: No scleral icterus. No injection or drainage.  Pupils 2 mm equal reactive.


NECK: Supple, trachea midline. No JVD or lymphadenopathy.  No meningismus


CARDIOVASCULAR: Regular rate and rhythm without murmurs, gallops, or rubs. 


RESPIRATORY: Breath sounds equal bilaterally. No accessory muscle use.


GASTROINTESTINAL: Abdomen soft, non-tender, nondistended. 


MUSCULOSKELETAL: No cyanosis, or edema. 


BACK: Nontender without obvious deformity. No CVA tenderness.


Neurologic exam normal.





Data


Data


Last Documented VS





Vital Signs








  Date Time  Temp Pulse Resp B/P (MAP) Pulse Ox O2 Delivery O2 Flow Rate FiO2


 


4/11/18 18:08  75 22 131/83 (99) 96 Room Air  


 


4/11/18 13:57 97.6       








Orders





 Orders


Electrocardiogram (4/11/18 14:03)


Complete Blood Count With Diff (4/11/18 14:03)


Basic Metabolic Panel (Bmp) (4/11/18 14:03)


Ckmb (Isoenzyme) Profile (4/11/18 14:03)


Troponin I (4/11/18 14:03)


Chest, Pa & Lat (4/11/18 14:03)


Cta Thor Abd Aorta W Iv C W3d (4/11/18 )





Labs





Laboratory Tests








Test


  4/11/18


14:32


 


White Blood Count 8.0 TH/MM3 


 


Red Blood Count 4.95 MIL/MM3 


 


Hemoglobin 14.2 GM/DL 


 


Hematocrit 42.2 % 


 


Mean Corpuscular Volume 85.3 FL 


 


Mean Corpuscular Hemoglobin 28.8 PG 


 


Mean Corpuscular Hemoglobin


Concent 33.7 % 


 


 


Red Cell Distribution Width 14.6 % 


 


Platelet Count 254 TH/MM3 


 


Mean Platelet Volume 9.1 FL 


 


Neutrophils (%) (Auto) 64.9 % 


 


Lymphocytes (%) (Auto) 24.0 % 


 


Monocytes (%) (Auto) 9.0 % 


 


Eosinophils (%) (Auto) 1.6 % 


 


Basophils (%) (Auto) 0.5 % 


 


Neutrophils # (Auto) 5.2 TH/MM3 


 


Lymphocytes # (Auto) 1.9 TH/MM3 


 


Monocytes # (Auto) 0.7 TH/MM3 


 


Eosinophils # (Auto) 0.1 TH/MM3 


 


Basophils # (Auto) 0.0 TH/MM3 


 


CBC Comment DIFF FINAL 


 


Differential Comment  


 


Blood Urea Nitrogen 16 MG/DL 


 


Creatinine 1.15 MG/DL 


 


Random Glucose 101 MG/DL 


 


Calcium Level 9.2 MG/DL 


 


Sodium Level 141 MEQ/L 


 


Potassium Level 3.9 MEQ/L 


 


Chloride Level 107 MEQ/L 


 


Carbon Dioxide Level 27.9 MEQ/L 


 


Anion Gap 6 MEQ/L 


 


Estimat Glomerular Filtration


Rate 47 ML/MIN 


 


 


Total Creatine Kinase 63 U/L 


 


Troponin I


  LESS THAN 0.02


NG/ML











MDM


Medical Decision Making


Medical Screen Exam Complete:  Yes


Emergency Medical Condition:  Yes


Interpretation(s)


1659 PM.  CBC within normal limits.  BMP within normal limits.  Creatinine 

1.15.  Cardiac enzymes are normal.


1836 PM.


Last Impressions








Chest X-Ray 4/11/18 1403 Signed





Impressions: 





 Service Date/Time:  Wednesday, April 11, 2018 14:14 - CONCLUSION:  1. 





 Moderate-sized hiatal hernia. 2. No acute cardiopulmonary disease.     Owen Dupont MD 


 


Aorta CTA 4/11/18 0000 Signed





Impressions: 





 Service Date/Time:  Wednesday, April 11, 2018 17:20 - CONCLUSION:  1. Negative 





 for aortic dissection or aneurysm. 2. Moderate visible plaque in the left 





 anterior descending coronary artery. 3. Moderate size hiatal hernia. Colonic 





 diverticulosis.     Attila Smith MD 





1837 PM.  CBC within normal limits.  Creatinine 1.15.  Cardiac enzymes are 

normal.


Differential Diagnosis


Differential diagnosis including musculoskeletal, angina, MI, PE, pneumothorax.


Narrative Course


67-year-old female with chest pain intermittent and left-sided headache.  

Patient will be admitted to the chest pain center.





Diagnosis





 Primary Impression:  


 Chest pain


 Qualified Codes:  R07.9 - Chest pain, unspecified





Admitting Information


Admitting Physician Requests:  Observation











Harshal Mera MD Apr 11, 2018 15:47

## 2018-04-11 NOTE — RADRPT
EXAM DATE/TIME:  04/11/2018 14:14 

 

[facility] COMPARISON:     

CHEST SINGLE AP, August 24, 2017, 13:26.

 

                     

INDICATIONS :     

Chest pain in the center of patient;s chest.

                     

 

MEDICAL HISTORY :            

Hypercholesterolemia. Carcinoma, breast.   

 

SURGICAL HISTORY :     

Mastectomy, bilateral.   

 

ENCOUNTER:     

Initial                                        

 

ACUITY:     

1 day      

 

PAIN SCORE:     

2/10

 

LOCATION:      

chest Center

 

FINDINGS:     

Lungs are clear. Cardiomediastinal contours are within normal limits. Moderate-sized hiatal hernia. B
irena thorax is intact.

 

CONCLUSION:     

1. Moderate-sized hiatal hernia.

2. No acute cardiopulmonary disease.

 

 

 

 Owen Dupont MD on April 11, 2018 at 14:38           

Board Certified Radiologist.

 This report was verified electronically.

## 2018-04-11 NOTE — RADRPT
EXAM DATE/TIME:  04/11/2018 17:20 

 

[facility] COMPARISON:     

No previous studies available for comparison.

 

 

INDICATIONS :     

Chest discomfort.

                           

 

IV CONTRAST:     

75 cc Omnipaque 350 (iohexol) IV 

 

 

RADIATION DOSE:     

9.41 CTDIvol (mGy) 

 

 

MEDICAL HISTORY :     

Cardiovascular disease. Hypertension. Carcinoma, breast.

 

SURGICAL HISTORY :      

Mastectomy, bilateral. 

 

ENCOUNTER:      

Initial

 

ACUITY:      

1 day

 

PAIN SCALE:      

5/10

 

LOCATION:       

Bilateral  abdomen

 

TECHNIQUE:     

Volumetric scanning was performed using a multi-row detector CT scanner.  The data was post processed
 with a variety of visualization algorithms including full volume maximum intensity projection, multi
-planar sliding thin slab reformation, curved planar reformation, and surface rendering techniques.  
Using automated exposure control and adjustment of the mA and/or kV according to patient size, radiat
ion dose was kept as low as reasonably achievable to obtain optimal diagnostic quality images.  DICOM
 format image data is available electronically for review and comparison.  

 

FINDINGS:     

There is no thoracic or abdominal aortic aneurysm or dissection. There is moderate plaque in the left
 anterior descending artery.

 

There is dependent atelectasis in the lungs and areas of linear atelectasis or scarring. No pleural o
r pericardial effusion. Moderate size hiatal hernia.

 

No acute findings in the liver, spleen, adrenals, kidneys or pancreas. 3 mm nonobstructing left renal
 calculus. Proximal celiac, superior mesenteric and intramesenteric arteries are patent.

 

Colonic diverticula noted without diverticulitis.

 

CONCLUSION:     

1. Negative for aortic dissection or aneurysm.

2. Moderate visible plaque in the left anterior descending coronary artery.

3. Moderate size hiatal hernia. Colonic diverticulosis.

 

 

 

 Attila Smith MD on April 11, 2018 at 18:20           

Board Certified Radiologist.

 This report was verified electronically.

## 2018-04-12 VITALS
SYSTOLIC BLOOD PRESSURE: 98 MMHG | DIASTOLIC BLOOD PRESSURE: 73 MMHG | RESPIRATION RATE: 20 BRPM | TEMPERATURE: 97.5 F | HEART RATE: 97 BPM | OXYGEN SATURATION: 93 %

## 2018-04-12 VITALS
HEART RATE: 80 BPM | SYSTOLIC BLOOD PRESSURE: 164 MMHG | RESPIRATION RATE: 18 BRPM | OXYGEN SATURATION: 98 % | DIASTOLIC BLOOD PRESSURE: 82 MMHG | TEMPERATURE: 97.4 F

## 2018-04-12 VITALS
HEART RATE: 82 BPM | DIASTOLIC BLOOD PRESSURE: 62 MMHG | TEMPERATURE: 97.6 F | SYSTOLIC BLOOD PRESSURE: 104 MMHG | RESPIRATION RATE: 18 BRPM | OXYGEN SATURATION: 95 %

## 2018-04-12 VITALS — HEART RATE: 75 BPM

## 2018-04-12 VITALS
DIASTOLIC BLOOD PRESSURE: 68 MMHG | SYSTOLIC BLOOD PRESSURE: 109 MMHG | RESPIRATION RATE: 17 BRPM | HEART RATE: 76 BPM | TEMPERATURE: 97.8 F | OXYGEN SATURATION: 95 %

## 2018-04-12 VITALS
SYSTOLIC BLOOD PRESSURE: 115 MMHG | DIASTOLIC BLOOD PRESSURE: 81 MMHG | RESPIRATION RATE: 16 BRPM | TEMPERATURE: 98.3 F | OXYGEN SATURATION: 95 % | HEART RATE: 77 BPM

## 2018-04-12 RX ADMIN — Medication SCH ML: at 09:00

## 2018-04-12 NOTE — HHI.HP
HPI


Primary Care Physician


[facility]


Chief Complaint


Chest heaviness


History of Present Illness


67-year-old female with history of hypertension and hyperlipidemia presents to 

emergency room for further evaluation of chest heaviness.  Onset 3-4 days ago.  

Described as "a lump in my throat."  Radiated to right anterior chest described 

as a "wave."  Duration of waves with last seconds followed with chest heaviness 

for approximately 30 minutes. No precipitating or relieving factors. Yesterday 

"waves of pressure" began radiating to left anterior chest.  Accompanying 

symptoms included "left ear clicking" and headache "on top of head." Headache 

not described as worse headache in life, duration lasted minutes. No associated 

symptoms of nausea, vomiting, dyspnea, or diaphoresis.  Denies similar pain in 

the past.  Yesterday's episodes became more frequent and severe therefore came 

to the ER for further evaluation of chest pain.  Initially felt discomfort made 

and related to acid reflux, try taking bicarb without relief.  Dr. Valenzuela 

is her cardiologist.  Follows with Dr. Valenzuela for hypertension and 

hyperlipidemia, denying any known coronary artery disease or cardiac issues.





Review of Systems


General: No fatigue,weakness, fever, chills, recent illness, or change in 

appetite.  Has been her general state of health.


HEENT: Headache resolved quickly, history of migraines, no further headaches, 

no vision changes, no nasal congestion or drainage, no dysphasia


CV: As stated above. Denies every having any chest pain per say, described 

discomfort as pressure. 


RESP: No SOB, cough, wheeze, or recent upper respiratory infection.


GI: No nausea, vomiting, bowel changes, diarrhea, constipation, pain, distention

, melena, or blood in the stool. 


: No dysuria, urgency, frequency


EXT: No lower leg edema, no paraesthesias


MS: No discomfort, injury, trauma, or change in ROM


NEURO: No change in memory, difficulty with balance, LOC, motor/sensory deficits


PSYCH: No anxiety, depression, suicidal ideation


SKIN: History of eczema, currently prescribed daily ointment. No concerning 

lesions





Past Family Social History


Allergies:  


Coded Allergies:  


     No Known Allergies (Verified  Adverse Reaction, Unknown, 18)


Past Medical History


Hyperlipidemia, hypertension, gout, GERD, eczema, breast cancer (-declined 

chemotherapy, states radiation not required, took Arimidex for awhile, stopped 

secondary to side effects)


Past Surgical History


Parathyroid removed (2015), bilateral mastectomy (Dec. 2016)


Reported Medications





Reported Meds & Active Scripts


Active


Omeprazole 40 Mg Cap 40 Mg PO DAILY


Reported


Betamethasone Dipropionate Topical 0.05% Cream 1 Applic TOPICAL BID


Ditropan (Oxybutynin Chloride) 5 Mg Tab 5 Mg PO HS


Metoprolol Tartrate 25 Mg Tab 25 Mg PO BID


Atorvastatin (Atorvastatin Calcium) 20 Mg Tab 20 Mg PO HS


Citracal Maximum (Calcium Citrate-Vitamin D) 315-250 Mg-Unit Tab 2 Tab PO BID


Tylenol (Acetaminophen) 325 Mg Tab 325 Mg PO Q6H PRN


Indomethacin 50 Mg Cap 50 Mg PO TID PRN


     Take with food, milk, or antacids to decrease


     stomach adverse effects.


Multi Vitamin Daily (Multiple Vitamin) 1 Tab Tab 1 Tab PO HS


Sumatriptan (Sumatriptan Succinate) 50 Mg Tab 50 Mg PO ONCE PRN


     If a satisfactory response has not been obtained at 2 hours, a


     second


     dose may be administered


Active Ordered Medications





Current Medications








 Medications


  (Trade)  Dose


 Ordered  Sig/Isha


 Route  Start Time


 Stop Time Status Last Admin


 


  (NS Flush)  2 ml  UNSCH  PRN


 IV FLUSH  18 19:00


     


 


 


  (NS Flush)  2 ml  BID


 IV FLUSH  18 21:00


     


 


 


  (Tylenol)  500 mg  Q4H  PRN


 PO  18 19:00


     


 


 


  (Zofran Inj)  4 mg  Q6H  PRN


 IV PUSH  18 19:00


     


 


 


  (Lopressor)  25 mg  BID


 PO  18 09:00


     


 


 


  (Nitrostat Sl)  0.4 mg  Q5M  PRN


 SL  18 07:30


     


 


 


  (Aspirin)  325 mg  DAILY


 PO  18 09:00


     


 








Family History


No early onset cardiovascular disease. Mother-CABG-at age 72, Father-CABG-at 

age 80, Brother CABG-at age 68 also with valve replacement.


Social History


Known hypertension hyperlipidemia.  No known coronary artery disease or 

diabetes.


Lifelong non-smoker.  Denies any alcohol or illegal drug use.


.  Endorses an active lifestyle walking daily.





Past cardiac testing


No recent cardiac testing, stating last stress testing "many years ago."  

Patient's cardiologist is Dr. ValenzuelaRosario Hatch medical records indicate Holter monitor completed 2017. Patient 

does not remember events leading to Holter monitor evaluation, but remembers 

this is time she began following with a cardiologist.





Physical Exam


Vital Signs





Vital Signs








  Date Time  Temp Pulse Resp B/P (MAP) Pulse Ox O2 Delivery O2 Flow Rate FiO2


 


18 07:39 97.4 80 18 164/82 (109) 98   


 


18 04:10 98.3 77 16 115/81 (92) 95   


 


18 00:27 97.8 76 17 109/68 (82) 95   


 


18 20:55 98.2 90 17 137/84 (101) 96   


 


18 20:29        


 


18 20:00  82 16 136/80 (98) 96 Room Air  


 


18 19:00  76 16 151/82 (105) 95 Room Air  


 


18 18:08  75 22 131/83 (99) 96 Room Air  


 


18 15:44  72 19 137/74 (95) 99 Room Air  


 


18 15:42  74 22 179/97 (124) 99 Room Air  


 


18 13:57 97.6 82 17 176/108 (130) 98   








Physical Exam


GENERAL: Alert WN, WD, NAD, pleasant, moderately obese, elderly,  

female


HEAD: NC, AT


EYES: Sclera clear, conjunctiva without injection


ENT: Mucous membranes pink and moist, no nasal discharge or bleeding


CV: RRR, without murmur, rub, gallop, no JVD, S1-S2 no S3-S4. 


RESP: Clear lungs throughout bilateral, no crackles, wheeze, rhonchi, 

symmetrical chest rise, nonlabored, able to speak in full sentences


ABD: Soft, NT, ND, no masses, positive bowel tones


EXT: Pulses +2x4, trace bilateral lower dependent edema


MS: Normal tone x4 extremities, no obvious deformities, full range of motion


NEURO: CN II through CN XII grossly intact, motor strength 5/5


PSYCH: A+O x3, pleasant affect, appropriate speech, mood, insight and judgment


SKIN: Normal turgor, normal texture, multiple eczematous lesions noted on arms, 

shoulder, back


Laboratory





Laboratory Tests








Test


  18


14:32 18


19:05 18


22:01


 


White Blood Count 8.0   


 


Red Blood Count 4.95   


 


Hemoglobin 14.2   


 


Hematocrit 42.2   


 


Mean Corpuscular Volume 85.3   


 


Mean Corpuscular Hemoglobin 28.8   


 


Mean Corpuscular Hemoglobin


Concent 33.7 


  


  


 


 


Red Cell Distribution Width 14.6   


 


Platelet Count 254   


 


Mean Platelet Volume 9.1   


 


Neutrophils (%) (Auto) 64.9   


 


Lymphocytes (%) (Auto) 24.0   


 


Monocytes (%) (Auto) 9.0   


 


Eosinophils (%) (Auto) 1.6   


 


Basophils (%) (Auto) 0.5   


 


Neutrophils # (Auto) 5.2   


 


Lymphocytes # (Auto) 1.9   


 


Monocytes # (Auto) 0.7   


 


Eosinophils # (Auto) 0.1   


 


Basophils # (Auto) 0.0   


 


CBC Comment DIFF FINAL   


 


Differential Comment    


 


Blood Urea Nitrogen 16   


 


Creatinine 1.15   


 


Random Glucose 101   


 


Calcium Level 9.2   


 


Sodium Level 141   


 


Potassium Level 3.9   


 


Chloride Level 107   


 


Carbon Dioxide Level 27.9   


 


Anion Gap 6   


 


Estimat Glomerular Filtration


Rate 47 


  


  


 


 


Total Creatine Kinase 63  53  55 


 


Troponin I LESS THAN 0.02  LESS THAN 0.02  LESS THAN 0.02 








Result Diagram:  


18 1432                                                                   

             18 1432





Imaging





Last 48 hours Impressions








Chest X-Ray 18 1403 Signed





Impressions: 





 Service Date/Time:  2018 14:14 - CONCLUSION:  1. 





 Moderate-sized hiatal hernia. 2. No acute cardiopulmonary disease.     Owen Dupont MD 


 


Aorta CTA 18 0000 Signed





Impressions: 





 Service Date/Time:  2018 17:20 - CONCLUSION:  1. Negative 





 for aortic dissection or aneurysm. 2. Moderate visible plaque in the left 





 anterior descending coronary artery. 3. Moderate size hiatal hernia. Colonic 





 diverticulosis.     Attila Smith MD 








Course


EKG


Sinus rhythm, no ST T-segment changes





Caprini VTE Risk Assessment


Caprini VTE Risk Assessment:  Mod/High Risk (score >= 2)


Caprini Risk Assessment Model











 Point Value = 1          Point Value = 2  Point Value = 3  Point Value = 5


 


Age 41-60


Minor surgery


BMI > 25 kg/m2


Swollen legs


Varicose veins


Pregnancy or postpartum


History of unexplained or recurrent


   spontaneous 


Oral contraceptives or hormone


   replacement


Sepsis (< 1 month)


Serious lung disease, including


   pneumonia (< 1 month)


Abnormal pulmonary function


Acute myocardial infarction


Congestive heart failure (< 1 month)


History of inflammatory bowel disease


Medical patient at bed rest Age 61-74


Arthroscopic surgery


Major open surgery (> 45 min)


Laparoscopic surgery (> 45 min)


Malignancy


Confined to bed (> 72 hours)


Immobilizing plaster cast


Central venous access Age >= 75


History of VTE


Family history of VTE


Factor V Leiden


Prothrombin 10238B


Lupus anticoagulant


Anticardiolipin antibodies


Elevated serum homocysteine


Heparin-induced thrombocytopenia


Other congenital or acquired


   thrombophilia Stroke (< 1 month)


Elective arthroplasty


Hip, pelvis, or leg fracture


Acute spinal cord injury (< 1 month)








Prophylaxis Regimen











   Total Risk


Factor Score Risk Level Prophylaxis Regimen


 


0-1      Low Early ambulation


 


2 Moderate Order ONE of the following:


*Sequential Compression Device (SCD)


*Heparin 5000 units SQ BID


 


3-4 Higher Order ONE of the following medications:


*Heparin 5000 units SQ TID


*Enoxaparin/Lovenox 40 mg SQ daily (WT < 150 kg, CrCl > 30 mL/min)


*Enoxaparin/Lovenox 30 mg SQ daily (WT < 150 kg, CrCl > 10-29 mL/min)


*Enoxaparin/Lovenox 30 mg SQ BID (WT < 150 kg, CrCl > 30 mL/min)


AND/OR


*Sequential Compression Device (SCD)


 


5 or more Highest Order ONE of the following medications:


*Heparin 5000 units SQ TID (Preferred with Epidurals)


*Enoxaparin/Lovenox 40 mg SQ daily (WT < 150 kg, CrCl > 30 mL/min)


*Enoxaparin/Lovenox 30 mg SQ daily (WT < 150 kg, CrCl > 10-29 mL/min)


*Enoxaparin/Lovenox 30 mg SQ BID (WT < 150 kg, CrCl > 30 mL/min)


AND


*Sequential Compression Device (SCD)











Assessment and Plan


Assessment and Plan


#1 Atypical chest pain-chest pain center.  Ruled out with 3 sets of EKGs, 

cardiac enzymes, monitor on telemetry overnight.  Seen and evaluated by Dr. Bettina Valenzuela.  Symptoms atypical for cardiac, however due to multiple risk 

factors recommend further cardiac stress testing.  States she is able to walk 

on treadmill, therefore will complete nuclear exercise stress test this a.m.  

If unremarkable, plans will be discharged home with follow-up with her PCP. 

Patient is agreeable to plan of care.


#2 History of hypertension-continue Toprol


#3 History of hyperlipidemia-continue atorvastatin


#4 History of GERD-continue omeprazole, discussed findings of a moderate hiatal 

hernia on scans completed in ER.  Reports will be given to her upon discharge 

for follow-up with her PCP.





7534 Dr. Valenzuela discussed Lexiscan results in length with patient and 

. Continue metoprolol and atorvastatin, adding amlodipine 2.5mg and 

aspirin 81 mg daily. Lab order to complete lipid panel and CMP given upon 

discharge. Follow up 10-14 days with Dr. Valenzuela in office.











Deborah Frazier 2018 08:19

## 2018-04-12 NOTE — EKG
Date Performed: 2018       Time Performed: 22:12:34

 

PTAGE:      67 years

 

EKG:      Sinus rhythm 

 

 NORMAL ECG Since 

 

 PREVIOUS TRACING            , no significant change noted PREVIOUS TRACIN2018 19.04

 

DOCTOR:   Bettina Valenzuela  Interpretating Date/Time  2018 17:58:26

## 2018-04-12 NOTE — EKG
Date Performed: 2018       Time Performed: 19:04:02

 

PTAGE:      67 years

 

EKG:      Sinus rhythm 

 

 NORMAL ECG Since 

 

 PREVIOUS TRACING            , no significant change noted PREVIOUS TRACIN2018 14.28

 

DOCTOR:   Bettina Valenzuela  Interpretating Date/Time  2018 17:58:44

## 2018-04-12 NOTE — TR
Date Performed: 04/12/2018       Time Performed: 11:03:33

 

DOCTOR:      Bettina Valenzuela 

 

DRUG LIST:     

CLINICAL HISTORY:     

REASON FOR TEST:      Chest pain

REASON FOR ENDING:     

OBSERVATION:     

CONCLUSION:      Randy protocol complete. Stopped sec to exceeding target heart rate and leg fatigue.
 Maximum RF=561 Target HR Achieved=91.0% Maximum YV=192/80. No reprod chest discomfort. Rare PAC. St 
segment upsloping, slighlty horizontial inferior leads at peak, however st segments are nondiagnostic
. Good exercise tolerance. Normal bp response. Recovery quick and unremarkable. Nuclear images zia crook

COMMENTS:      No ischemia

## 2018-04-12 NOTE — EKG
Date Performed: 2018       Time Performed: 14:28:17

 

PTAGE:      67 years

 

EKG:      Sinus rhythm 

 

 NORMAL ECG Since 

 

 PREVIOUS TRACING            , no significant change noted PREVIOUS TRACIN2017 13.11

 

DOCTOR:   Bettina Valenzuela  Interpretating Date/Time  2018 17:59:08

## 2018-11-21 NOTE — HHI.DCPOC
Discharge Care Plan


Diagnosis:  


(1) Atypical chest pain


(2) Hyperlipidemia


Goals to Promote Your Health


* To prevent worsening of your condition and complications


* To maintain your health at the optimal level


Directions to Meet Your Goals


*** Take your medications as prescribed


*** Follow your dietary instruction


*** Follow activity as directed








*** Keep your appointments as scheduled


*** Take your immunizations and boosters as scheduled


*** If your symptoms worsen call your PCP, if no PCP go to Urgent Care Center 

or Emergency Room***


*** Smoking is Dangerous to Your Health. Avoid second hand smoke***


***Call the 24-hour hour crisis hotline for domestic abuse at 1-524.220.8493***











Deborah Frazier Apr 12, 2018 17:55
no dysmenorrhea/no pelvic pain